# Patient Record
Sex: FEMALE | Race: WHITE | Employment: STUDENT | URBAN - METROPOLITAN AREA
[De-identification: names, ages, dates, MRNs, and addresses within clinical notes are randomized per-mention and may not be internally consistent; named-entity substitution may affect disease eponyms.]

---
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
## 2024-01-24 ENCOUNTER — TELEPHONE (OUTPATIENT)
Dept: OBGYN CLINIC | Facility: CLINIC | Age: 19
End: 2024-01-24

## 2024-01-24 ENCOUNTER — ROUTINE PRENATAL (OUTPATIENT)
Dept: OBGYN CLINIC | Facility: CLINIC | Age: 19
End: 2024-01-24
Payer: MEDICAID

## 2024-01-24 VITALS
HEART RATE: 87 BPM | SYSTOLIC BLOOD PRESSURE: 118 MMHG | DIASTOLIC BLOOD PRESSURE: 58 MMHG | BODY MASS INDEX: 24 KG/M2 | WEIGHT: 134 LBS

## 2024-01-24 DIAGNOSIS — Z34.00 ENCOUNTER FOR SUPERVISION OF NORMAL PREGNANCY IN TEEN PRIMIGRAVIDA, ANTEPARTUM: Primary | ICD-10-CM

## 2024-01-24 PROCEDURE — 99213 OFFICE O/P EST LOW 20 MIN: CPT | Performed by: NURSE PRACTITIONER

## 2024-01-24 PROCEDURE — 3078F DIAST BP <80 MM HG: CPT | Performed by: NURSE PRACTITIONER

## 2024-01-24 PROCEDURE — 3074F SYST BP LT 130 MM HG: CPT | Performed by: NURSE PRACTITIONER

## 2024-01-24 NOTE — TELEPHONE ENCOUNTER
Patient mobile number does not even ring.  No answer and no voicemail at home number.  Per verbal release- okay to share sensitive information with grandparent Alissa.  Attempted to reach Alissa at home and on cell but no answer and no voicemails.  Patient has not read [employer] medical message yet.

## 2024-01-24 NOTE — TELEPHONE ENCOUNTER
Pt was schedule for an DEION today at our [place] location with Raquel. I was unable to leave a voicemail for pt, but was able to send a MyChart message regarding missed appointment for today.

## 2024-01-24 NOTE — PROGRESS NOTES
Chief Complaint   Patient presents with    Prenatal Care     DEION       18 year old  at 28w6d who presents for routine OB visit without complaints. Doing well.   Patient denies any bleeding, leaking fluid, cramping, or contractions.  Assessment/Plan:   28w6d doing well.  Continue routine prenatal care  Kick counts reminded  Reviewed  labor signs and symptoms.  Reviewed vaccine recommendations:  Tdap recommended  Encouraged she complete her 1 hour GTT< HIV and T Pallidum added.  Diagnoses and all orders for this visit:    Encounter for supervision of normal pregnancy in teen primigravida, antepartum      Return in about 2 weeks (around 2024).     Subjective:   Review of Systems:  General: no complaints per category. See HPI for additional information.   Breast: no complaints per category. See HPI for additional information.   Respiratory: no complaints per category. See HPI for additional information.   Cardiovascular: no complaints per category. See HPI for additional information.   GI: no complaints per category. See HPI for additional information.   : no complaints per category. See HPI for additional information.   Heme: no complaints per category. See HPI for additional information.   Obstetrical History:   OB History    Para Term  AB Living   1             SAB IAB Ectopic Multiple Live Births                  # Outcome Date GA Lbr Robby/2nd Weight Sex Delivery Anes PTL Lv   1 Current              Gynecological History: na  Medications:   Prenatal Vit w/So-Hzkuutahl-EK (PNV OR) Take by mouth daily.      Pyridoxine HCl (VITAMIN B-6 OR) Take by mouth daily as needed.        Allergies:  No Known Allergies  Past Medical History:  History reviewed. No pertinent past medical history.  Past Surgical History:  History reviewed. No pertinent surgical history.  Immunizations:   Immunization History   Administered Date(s) Administered    DTAP 2005, 2005, 10/20/2005, 10/20/2005,  2006, 2006, 2008, 01/10/2008    DTAP-IPV 07/15/2010    FLU VAC QIV SPLIT 3 YRS AND OLDER (34513) 2019    FLUMIST Intranasal Influenza 10/20/2010, 2012    HEP A 2014    HEP A,Ped/Adol,(2 Dose) 2014, 08/10/2016    HEP B Vaccine 2005, 10/20/2005, 2006    HEP B/HIB 2005, 10/20/2005, 2006    Haemophilus B Polysac Conj Vac Im 2005, 10/20/2005, 2006    Hpv Virus Vaccine 9 Courtney Im 08/10/2016, 2018    IPV 2005, 2005, 10/20/2005, 10/20/2005, 2008, 01/10/2008    Influenza 10/20/2010, 2012    Influenza(Afluria)0.5ml QIV PFS 2021    MMR 2006, 07/15/2010    Meningococcal-Menactra 08/10/2016    Pneumococcal (Prevnar 13) 2005, 10/20/2005, 2006    Pneumococcal (Prevnar 7) 2005, 10/20/2005, 2006    TDAP 08/10/2016    Varicella 2006, 07/15/2010     Objective:     Vitals:    24 1445   BP: 118/58   Pulse: 87   Weight: 134 lb (60.8 kg)     Body mass index is 24.12 kg/m².  Physical Examination:  General appearance: Well dressed, well nourished in no apparent distress  Neurologic/Psychiatric: Alert and oriented to person, place and time, mood normal, affect appropriate  Abdomen: Soft,     Patient Active Problem List    Diagnosis    Marijuana use during pregnancy     Denies any alcohol or other drug use  [X] MFM  Cannabis cessation advised       Supervision of normal first teen pregnancy    Mood disorder (HCC)    Academic problem    Attention deficit hyperactivity disorder (ADHD), combined type     Total patient time was 20 minutes in reviewing paper/electronic records, reviewing test results from outside care provider, obtaining history, evaluating the patient, consultation, discussing treatment options, coordination of care and completing documentation. This included face to face and non-face to face actions. The patient's questions and concerns were addressed.

## 2024-02-02 NOTE — TELEPHONE ENCOUNTER
30w1d   Patient is flying to [place] just for the weekend to visit family.  Patient is familiar with the area and has access to emergency care if needed.  Informed patient that the airport scanners/metal detectors do not pose a risk to her pregnancy.  Discussed higher risk for blood clots in pregnancy.  Advised staying well hydrated, walking about the cabin once every hour (when safe), wearing seat belt.  Reviewed symptoms of blood clot and instructed to seek emergency care if any develop.  Patient verbalized understanding.   Next appointment scheduled 2/5/24.

## 2024-02-05 NOTE — PROGRESS NOTES
Chief Complaint   Patient presents with    Prenatal Care     [place]     Routine prenatal visit complaining of rash under right amrpit  Patient denies any bleeding, leaking fluid, cramping, or regular uterine contractions. Good fetal movement.  R axilla- rash consistent with candidiasis  Assessment/Plan:  30w4d doing well  Recommend OTC antifungal  OVERDUE FOR GLUCOLA.  Kick counts reminded  Reviewed  labor signs and symptoms.  Reviewed vaccine recommendations:  Tdap today.  Diagnoses and all orders for this visit:    Prenatal care, antepartum    Need for vaccination  -     TdaP (Boostrix/Adacel) Vaccine (> 7 Y)       Return in about 2 weeks (around 2024) for Routine Prenatal Visit, Labs Next Available.

## 2024-02-05 NOTE — PATIENT INSTRUCTIONS
LABOR & DELIVERY PRE-REGISTRATION    Thank you for choosing [facility] for you labor and delivery needs.  We look forward to caring for you and your family in this exciting time.  In order to better care for all of our patients, [facility] recommends that you complete your delivery registration as early in your pregnancy as possible.  Registering for your delivery in advance saves you the time of doing so on your day of delivery and allows your care team to be better prepared for your delivery date.  For more information about Labor and Delivery at [facility] and to pre-register, visit [email or web address]--> Search Our Services-->Pregnancy & Baby.    TWO WAYS TO REGISTER ONLINE    Epic MyChart allows access to multiple medical organizations, including [facility] and other medical organizations that use the Epic electronic health record system.  To add [facility] or any other medical organization, just tap My Organizations at the top left corner of the login page in the [email or web address] jefry, and then click Add Organization.  Cook in [email or web address]- Pre-register for your hospital stay through your [email or web address] account.  After logging into [email or web address], click on Visits.  Under Visits, click on Cook for Delivery and follow the directions to register.  You may print the confirmation page.  If you do not already have a [employer] account, ask our office for an Access Code.  Go to [email or web address] and follow the prompts to set up your account.  Cook on [email or web address]- Pre-register for your hospital stay through the convenient online form on our website.  Go to [email or web address].  Scroll down the page and click on [facility].  Fill out all of the required information and click submit.  You will receive a confirmation of your submission.  Questions about registering for your hospital stay?  Contact the [facility]-Labor & Delivery at [phone].      PRENATAL CLASSES    Both in-person and  virtual classes are available.  Weeknight and weekend classes are available.  Go to [email or web address]   or   [email or web address]  Click on drop down menu on the right side  Scroll down to \"Find a Class or Support Group\"  Scroll down and click on \"[facility]\"  Type any of the following in the Search Bar  - Babycare  - Virtual Tour- for the [facility]  - Natural Childbirth (even if you are planning on having an epidural).  Topics include labor and birth, breathing techniques, epidurals, postpartum issues.  - Virtual Childbirth Express  - Breastfeeding       FETAL MOVEMENT CHART    Although not all women need to perform daily fetal movement counts, if you notice a decrease in fetal activity, you should contact our office immediately.      Begin counting fetal movements at 32 weeks of pregnancy.  You may find that your baby is more active after eating or drinking.       We want you to time how long it takes to feel 10 movements (kicks, flutters, swishes or rolls).  You should feel at least 10 movements in 2 hours.  You will likely feel 10 movements in less than 2 hours.    If you have concerns, you can use the attached table to record movements.  Record the time you feel the first movement and the tenth movement.  This will help you observe patterns and discover how long it normally takes your baby to move 10 times.       Please call us if any of the following occurs:  You have not felt the baby move for a couple of hours  It is taking longer each day to get the tenth movement    The main point is we want you to know the characteristics of your baby, so you can tell the doctor or nurse if something different is happening.    Again, if you notice a decrease in fetal movement, please give the office a call.    If our office is closed, the answering service will page the doctor on-call.    ------------------------------      Time M T W Th F S S                                                                                                                  Time M T W Th F S S                                                                                                           Time M T W Th F S S                                                                                                           Time M T W Th F S S                                                                                                         VACCINE RECOMMENDATIONS     Pertussis- [place] has significant outbreaks of pertussis (whooping cough) every year.  Therefore, the CDC recommends that all pregnant women receive a Tdap vaccine during pregnancy, regardless of whether you have received one previously.  The vaccine reduces your chances of mohan the illness, and also provides some natural immunity to your baby for possible exposures after birth.  The best time to get the vaccine is between 27 and 36 weeks.  Baby caregivers (grandparents, spouse) should also make sure they are up to date on the vaccine (within the last 10 years).     Influenza- Pregnant women who develop the flu are more prone to serious complications that can affect both mother and baby.  The CDC recommends that all women who will be pregnant during flu season (October to May) receive the flu vaccine (injection only, not nasal spray).  The vaccine can be given during any stage of pregnancy.     Both vaccines are offered in our office, as well as through many pharmacies and primary care offices.    Covid-19 Vaccine   If you are pregnant or were recently pregnant, you are more likely to get very sick from COVID-19 than people who are not pregnant. Additionally, if you have COVID-19 during pregnancy, you are more likely to have complications that can affect your pregnancy and developing baby.  Please check the CDC website for the most up-to-date recommendations on Covid vaccination at www.cdc.gov/coronavirus/vaccine    COVID-19  vaccination is recommended for everyone ages 6 months and older, including people who are pregnant, breastfeeding, trying to get pregnant now, or who might become pregnant in the future. Evidence shows that COVID-19 vaccination before and during pregnancy is safe and effective and suggests that the benefits of vaccination outweigh any known or potential risks. New data show that vaccination during pregnancy can help protect babies younger than 6 months old, when they are too young to be vaccinated themselves, from hospitalization due to COVID-19.    RSV Vaccine  RSV (Respiratory Syncytial Virus) is a common respiratory virus that causes cold like symptoms in adults and babies.  Infants and adults over 60 years old are more likely to develop severe RSV infection requiring hospitalization.  A new RSV vaccine was released in October 2023 that if given during the third trimester of pregnancy, reduces your baby's risk of being hospitalized with RSV after birth by up to 80%.  Therefore the CDC recommends that pregnant moms receive one dose of the RSV vaccine Pfizer Abrysvo sometime between 32 and 36 weeks of pregnancy, before or during RSV season (September through January).  If you decide not to get the vaccine, you can talk to your pediatrician about your baby receiving the RSV antibody injection Beyfortus after birth.

## 2024-03-06 NOTE — PROGRESS NOTES
DEION  Doing well, no concerns or questions  GOOD FM  Denies VB/LOF/uctx  Discussed 1 hour GTT, upcoming GBS  RTC in 1-2 wks  Fetal movement discussed/ emphasized

## 2024-03-20 NOTE — PROGRESS NOTES
[facility]  Obstetrics and Gynecology  Routine OB visit Progress Note    Subjective:     Shelby Montgomery is a 18 year old  at 36w6d who presents for routine OB visit.  Patient reports doing well.  Denies contractions, vaginal bleeding or leakage of fluid.  Good fetal movement.      Assessment:     Shelby Montgomery is a 18 year old  at 36w6d who presents for routine OB visit. Overall doing well.     Problem List Items Addressed This Visit          Mental Health    Marijuana use during pregnancy (Formerly [facility])    Relevant Orders    Drug Abuse Panel 10 w/confirm       Gravid and     Supervision of normal first teen pregnancy (Formerly [facility]) - Primary    Relevant Orders    Hemoglobin A1C     Other Visit Diagnoses       Encounter for supervision of normal pregnancy, antepartum, unspecified  (Formerly [facility] [place])        Relevant Orders    Group B Strep PCR                Plan:     Patient Active Problem List    Diagnosis    Marijuana use during pregnancy (Formerly [facility])     Denies any alcohol or other drug use. Reports stopped 4 days ago on .   [X] MFM  [ ] urine tox  Cannabis cessation advised       Supervision of normal first teen pregnancy (Former[facility])     [  ] Overdue for glucola - reports difficulty with getting ride to lab   [ ] HgbA1C ordered       Mood disorder (Formerly [facility])    Academic problem    Attention deficit hyperactivity disorder (ADHD), combined type       - continue routine prenatal care   - labor and rupture of membrane precautions provided  -Fetal movement precautions given  GBS No results found for: \"GBS\" - collected   -Encouraged patient to complete 1 hour glucose tolerance test, discussed with patient importance of screening for diabetes in pregnancy      Return to clinic in 1 week for DEION visit           History/Other:     Review of Systems:  General: no complaints per category. See HPI for additional information.   Breast: no complaints per category. See HPI for additional information.    Respiratory: no complaints per category. See HPI for additional information.   Cardiovascular: no complaints per category. See HPI for additional information.   GI: no complaints per category. See HPI for additional information.   : no complaints per category. See HPI for additional information.   Heme: no complaints per category. See HPI for additional information.     Obstetrical History:   OB History    Para Term  AB Living   1             SAB IAB Ectopic Multiple Live Births                  # Outcome Date GA Lbr Robby/2nd Weight Sex Delivery Anes PTL Lv   1 Current                  Gynecological History:     Patient's last menstrual period was 07/15/2023 (approximate).      Medications:   cephalexin 500 MG Oral Cap       promethazine 25 MG Oral Tab       Prenatal Vit w/Gf-Planxfkbk-QJ (PNV OR) Take by mouth daily.          Allergies:  No Known Allergies    Past Medical History:  History reviewed. No pertinent past medical history.    Past Surgical History:  History reviewed. No pertinent surgical history.    Immunizations:   Immunization History   Administered Date(s) Administered    DTAP 2005, 2005, 10/20/2005, 10/20/2005, 2006, 2006, 2008, 01/10/2008    DTAP-IPV 07/15/2010    FLU VAC QIV SPLIT 3 YRS AND OLDER (06768) 2019    FLUMIST Intranasal Influenza 10/20/2010, 2012    HEP A 2014    HEP A,Ped/Adol,(2 Dose) 2014, 08/10/2016    HEP B Vaccine 2005, 10/20/2005, 2006    HEP B/HIB 2005, 10/20/2005, 2006    Haemophilus B Polysac Conj Vac Im 2005, 10/20/2005, 2006    Hpv Virus Vaccine 9 Courtney Im 08/10/2016, 2018    IPV 2005, 2005, 10/20/2005, 10/20/2005, 2008, 01/10/2008    Influenza 10/20/2010, 2012    Influenza(Afluria)0.5ml QIV PFS 2021    MMR 2006, 07/15/2010    Meningococcal-Menactra 08/10/2016    Pneumococcal (Prevnar 13) 2005, 10/20/2005, 2006     Pneumococcal (Prevnar 7) 04/09/2005, 10/20/2005, 05/26/2006    TDAP 08/10/2016, 02/05/2024    Varicella 07/31/2006, 07/15/2010         Objective:     Objective:       Vitals:    03/20/24 1032   BP: 112/72   Pulse: 64   Weight: 147 lb 8 oz (66.9 kg)   Height: 62.5\"         Body mass index is 26.55 kg/m².    General: AAO.NAD.   CVS exam: normal peripheral perfusion  Chest: non-labored breathing, no tachypnea   Abdominal exam: gravid   Pelvic exam:    VULVA: normal appearing vulva with no masses, tenderness or lesions  PERINEUM: normal appearing, no lesions   URETHRAL MEATUS:  normal appearing, no lesions   VAGINA: normal appearing vagina with normal color and discharge, no lesions  CERVIX: normal appearing cervix without discharge or lesions. SVE 0/50/-3.   UTERUS: uterus is normal size, shape, consistency and nontender  ADNEXA: deferred  PERIRECTAL: normal appearing, no lesions     Labs:  Prenatal Results       Initial Prenatal Labs EDWARD (GA 0-24w)       Test Value Reference Range Date Time    ABO Group  O   12/08/23 1115    RH Type  Positive   12/08/23 1115    Antibody Screen OB  Negative   12/08/23 1115    Rubella Titer OB  Positive  Positive 12/08/23 1115    Hep B Surf Ag OB  Nonreactive  Nonreactive  12/08/23 1115    RPR Serology        TREP  Nonreactive  Nonreactive  12/08/23 1115    HIV Combo Result OB  Non-Reactive  Non-Reactive 12/08/23 1115    HGB  11.9 g/dL 12.0 - 16.0 12/08/23 1115    HCT  35.0 % 35.0 - 48.0 12/08/23 1115    MCV  91.1 fL 80.0 - 100.0 12/08/23 1115    Platelets  200.0 10(3)uL 150.0 - 450.0 12/08/23 1115    Urine Culture  No Growth at 18-24 hrs.   10/17/23 1625    HCV  Nonreactive  Nonreactive  12/08/23 1115              Additional Prenatal Labs (GA 0-24w)       Test Value Reference Range Date Time    Chlamydia with Pap  Negative  Negative 10/17/23 1625    GC with Pap  Negative  Negative 10/17/23 1625    Chlamydia        GC         Pap Smear        HPV        Sickel Cell Solubility HGB                   2nd Trimester Labs (GA 24-41w)       Test Value Reference Range Date Time    Antibody Screen OB        Serology RPR        HGB  11.3 g/dL 12.0 - 16.0 12/27/23 1506    HCT  33.9 % 35.0 - 48.0 12/27/23 1506    Glucose 1 hour        Glucose Bel 3 hr Gestational Fasting        1 Hour glucose        2 Hour glucose        3 hour glucose        Ferritin                  3rd Trimester (28-41w)       Test Value Reference Range Date Time    Blood Type        Antibody screen        Group B Strep OB        HGB        HCT        HIV Combo Result OB  Non-Reactive  Non-Reactive 02/03/24 1109    Rapid HIV        Ferritin                  First Trimester & Genetic Testing (GA 0-40w)       Test Value Reference Range Date Time    MaternaT-21 (T13)        MaternaT-21 (T18)        MaternaT-21 T(T21)        VISIBILI T (T21)        VISIBILI T (T18)        QNatal T13        QNatal T18        QNatal T21        Cystic Fibrosis Screen [32]        Cystic Fibrosis Screen [165]        Cystic Fibrosis Screen [165]        Cystic Fibrosis Screen [165]        Cystic Fibrosis Screen [165]        RH d ([employer])        CVS        Nuchal Screening        NIPT [T13]        NIPT [T18]        NIPT [T21]        Carrier Screen        First Trimester Screen                  Genetic Screening (GA 0-45w)       Test Value Reference Range Date Time    AFP Tetra-Patient's HCG        AFP Tetra-Mom for HCG        AFP Tetra-Patient's UE3        AFP Tetra-Mom for UE3        AFP Tetra-Patient's DAVID        AFP Tetra-Mom for DAVID        AFP Tetra-Patient's AFP        AFP Tetra-Mom for AFP        AFP. Spina Bifida        Quad Screen ([facility])        AFP        SMA                  Legend    ^: Historical                                  All of the findings and plan were discussed with the patient.  She notes understanding and agrees with the plan of care.  All questions were answered to the best of my ability at this time.      Total patient time was 24 minutes  in evaluation, consultation, and coordination of care. This included face to face and non-face to face actions. The patient's questions and concerns were addressed.       Apryl Potter MD   [facility] - OBGYN        Note to patient and family   The 21st Century Cures Act makes medical notes available to patients in the interest of transparency.  However, please be advised that this is a medical document.  It is intended as fids-we-vabl communication.  It is written and medical language may contain abbreviations or verbiage that are technical and unfamiliar.  It may appear blunt or direct.  Medical documents are intended to carry relevant information, facts as evident, and the clinical opinion of the practitioner.      This note could include assistance by Dragon voice recognition. Errors in content may be related to improper recognition by the system; efforts to review and correct have been done but errors may still exist.

## 2024-03-20 NOTE — PATIENT INSTRUCTIONS
Gestational Diabetes Screening Instructions     This is a routine test done during pregnancy during the 24-28th week of gestation. The purpose of this test is to determine if your body is able to process sugar and to see if you have developed gestational diabetes.   Please follow the instructions below very carefully so you will not have to repeat the test.     1.  ___x___ One Hour Glucose Test--Do not eat anything two hours before   your scheduled test. Plan to arrive at the office at least 15 minutes before   your scheduled appointment. Please notify the  that you are   having the sugar test done. This test may not be scheduled in the last   hour of our business.       ______ Three Hour Glucose Test--You may not have food 8 hours   before  your scheduled test. Drinking water is allowed during these 8   hours. Plan to  arrive at the office at least 15 minutes before your    scheduled appointment. Please notify the  that you are   having the sugar test done. To make this easiest on yourself, this    test should be scheduled during the morning hours of our business.     2. You will be given a chilled glucola drink. You must drink the entire    contents of the bottle within 5 minutes. The time you finish the drink must   be marked down.     3. After starting the test, you will not be allowed to leave the office or have   anything to eat or drink. This includes all foods, beverages, candy, mints,   gum, etc.     4.  Your blood will be drawn in the office lab, exactly sixty minutes after you   have finished the glucola drink.     5. Your provider/staff nurse will notify you of your results when they come   back.    Labor Instructions    How do I know if it’s true labor?  One of the most important aspects of any pregnancy is being able to recognize the onset of labor.  Unfortunately, on occasion it can be difficult or confusing, especially if you have had one or more children.  Each labor can  begin in a different way even if you have had four or five children.    If this is your first child, it is very common to have labor for an average greater than 10 hours; however, there have been rare instances for labor to be two hours or less for a first time mother. It is more important for you to know if this is your second or third baby to realize that any labor after the first is usually shorter.  There is no way to tell how long or short the labor will be. Therefore, please call us if you are unsure labor has started.       Usually, during the last six weeks of pregnancy, [place]-Casas contractions or “false labor pains occur”.  False labor is generally not very painful though it is not always easy to tell.  You may feel contractions, cramps or uterine tightening somewhere between every 3-30 minutes but they will not continue to get stronger over time.  If you lie down, drink plenty of fluid or walk around, the contractions may go away.   False contractions are very common if you have been active on your feet for several hours.   Women frequently worry about being sent home from the hospital without having their baby (i.e. the labor stopped).  Actually, this is an unnecessary worry, for this is an infrequent occurrence.     True labor usually begins in one of two ways.  In most patients it begins with contractions of the uterus, which are irregular (but not always) in the beginning.  They are cramp-like in character and feel similar to menstrual cramps.  After a while, they become more regular, and they seem to last a little longer, and feel a little sharper.  These symptoms are very important-more important- than the timing of the contractions.  Having regular (usually closer), longer lasting (35-70 seconds), and sharper (more painful) contractions are the common symptoms of actual labor.  The second way in which labor can begin which occurs in approximately 30% of all patients is the rupture of the bag of  water.  This is a sudden gush of watery fluid, usually sufficient to run down your leg and onto the floor, or you may wet a large area of the bed if it happens at night.  There may also be tricking that is uncontrolled.  If you are unsure, please call the office.      When should I call?  When contractions are strong and every 3-5 minutes.  If you have a gush of water or you think you might be leaking fluid.  If you are bleeding heavily.  If your baby is not moving around at least every 1-2 hours.  If you are worried about something.  When you think you are ready to go to the hospital.    Who do I call?  Call the office at [phone].  If the office is closed, the answering service will send a message to the physician on call.  The on call physician will be available for emergency phone calls only.          Can I eat in labor?  It is good to eat a light meal at home before going to the hospital.  Eat foods like crackers, popsicles, soup and fruit.  Avoid foods that are difficult to digest like meat, a lot of dairy products and high fat foods.  DO NOT EAT if you know you are scheduled for a  ().      What will happen when I get to the hospital?  When you arrive at the hospital, you will be admitted and examined.   There are a few factors that will determine if you will be allowed to be up out of bed or if you would need to stay in bed.  The main factors are how well the baby is entering into the pelvis and if the bag of acosta is in intact or ruptured.  An intravenous (IV) solution will, with exceptions, be started.  This is extremely important especially for the baby.   Your  will be allowed in the room during your labor. During the delivery, the nurses will inform you of the hospital policy and how many coaches are allowed.  You may desire pain medication or anesthesia for pain.  You probably discussed some aspects of pain medication with us during your prenatal care.  The various options  may also have been discussed in Prenatal Classes.  We utilize IV narcotics and epidural anesthesia when our patients request to have them.  If you chose to have no anesthesia, none will be administered.  A local anesthetic may be used at the time of delivery      What should I to bring to the hospital?  Maternity clothes to go home in  You can bring your own night gown to wear after giving birth, but most women prefer to wear the hospital gown because it may get soiled  Nursing Bra if you are planning to breastfeed  Clothes for your baby to go home in  Baby Car Seat.  Be sure you know how to install it correctly. Please install it before going to the hospital  Routine toiletries like toothbrush, shampoo, hairbrush and etc.   You can bring your favorite pillow, but please put a colored pillow case on it so it doesn’t get mixed up with hospital pillows    How long will I stay in the hospital?  The date you leave the hospital may vary depending on the speed of your recovery.  If you have a vaginal delivery, you will stay in the hospital 24-48 hours after your delivery as long as you aren’t having any complications.    If you have had a , you will stay in the hospital 48-72 hours as long as you aren’t having any complications.       Going Home Instructions  There are no set rules as to what you may do each day or week after you are home.  You will receive discharge instructions to help you each day.  Remember, early ambulation in the hospital is to prevent complications.  Do not let this lull you into a false sense of strength or ability to do certain physical acts which may tire you excessively.  Please call the office within a few days after you are discharged from the hospital to schedule your post-partum visit, which is usually 4-6 weeks after delivery.    Any medications necessary will be discussed on an individual basis.  If you decide to breastfeed your baby, you should continue your prenatal vitamins.   If you do not breastfeed, simply finish the prenatal vitamins you have.      The staff at [facility] wish you a joyous and exciting birth.  If there is anything we can do to make this a better experience for you please do not hesitate to ask.

## 2024-04-04 NOTE — PROGRESS NOTES
Pt is a  at 39 0/7 wk iup who is brought to room triage-2 for NST. Pt was seen in the office early this afternoon and variable decelerations were noted on her NST. Pt was instructed to come to L&D for further eval at that time. Pt reports + fm and  denies LOF, VB or UC's. General hx taken and general assessment done. EFM tested and applied. POC discussed and questions answered.

## 2024-04-04 NOTE — PLAN OF CARE
Problem: BIRTH - VAGINAL/ SECTION  Goal: Fetal and maternal status remain reassuring during the birth process  Description: INTERVENTIONS:  - Monitor vital signs  - Monitor fetal heart rate  - Monitor uterine activity  - Monitor labor progression (vaginal delivery)  - DVT prophylaxis (C/S delivery)  - Surgical antibiotic prophylaxis (C/S delivery)  Outcome: Progressing

## 2024-04-04 NOTE — PATIENT INSTRUCTIONS
Labor Instructions    How do I know if it’s true labor?  One of the most important aspects of any pregnancy is being able to recognize the onset of labor.  Unfortunately, on occasion it can be difficult or confusing, especially if you have had one or more children.  Each labor can begin in a different way even if you have had four or five children.    If this is your first child, it is very common to have labor for an average greater than 10 hours; however, there have been rare instances for labor to be two hours or less for a first time mother. It is more important for you to know if this is your second or third baby to realize that any labor after the first is usually shorter.  There is no way to tell how long or short the labor will be. Therefore, please call us if you are unsure labor has started.       Usually, during the last six weeks of pregnancy, Nikolas-Casas contractions or “false labor pains occur”.  False labor is generally not very painful though it is not always easy to tell.  You may feel contractions, cramps or uterine tightening somewhere between every 3-30 minutes but they will not continue to get stronger over time.  If you lie down, drink plenty of fluid or walk around, the contractions may go away.   False contractions are very common if you have been active on your feet for several hours.   Women frequently worry about being sent home from the hospital without having their baby (i.e. the labor stopped).  Actually, this is an unnecessary worry, for this is an infrequent occurrence.     True labor usually begins in one of two ways.  In most patients it begins with contractions of the uterus, which are irregular (but not always) in the beginning.  They are cramp-like in character and feel similar to menstrual cramps.  After a while, they become more regular, and they seem to last a little longer, and feel a little sharper.  These symptoms are very important-more important- than the timing of the  contractions.  Having regular (usually closer), longer lasting (35-70 seconds), and sharper (more painful) contractions are the common symptoms of actual labor.  The second way in which labor can begin which occurs in approximately 30% of all patients is the rupture of the bag of water.  This is a sudden gush of watery fluid, usually sufficient to run down your leg and onto the floor, or you may wet a large area of the bed if it happens at night.  There may also be tricking that is uncontrolled.  If you are unsure, please call the office.      When should I head to the hospital?  When contractions are strong and every 5 minutes for one hour.  If you have a gush of water or you think you might be leaking fluid.  If you are bleeding heavily.  If your baby is not moving around at least every 1-2 hours.  If you are worried about something.  When you think you are ready to go to the hospital.    Who do I call with concerns?  Call the office at [phone].  If the office is closed, the answering service will send a message to the physician on call.  The on call physician will be available for emergency phone calls only.          Can I eat in labor?  It is good to eat a light meal at home before going to the hospital.  Eat foods like crackers, popsicles, soup and fruit.  Avoid foods that are difficult to digest like meat, a lot of dairy products and high fat foods.  DO NOT EAT if you know you are scheduled for a  ().      What will happen when I get to the hospital?  You are going to [facility] in [facility].  After 8 pm, you will need to go through the Emergency Department.  When you arrive at the hospital, you will be admitted and examined.   There are a few factors that will determine if you will be allowed to be up out of bed or if you would need to stay in bed.  The main factors are how well the baby is entering into the pelvis and if the bag of acosta is in intact or ruptured.   An intravenous (IV) solution will, with exceptions, be started.  This is extremely important especially for the baby.   Your  will be allowed in the room during your labor. During the delivery, the nurses will inform you of the hospital policy and how many coaches are allowed.  You may desire pain medication or anesthesia for pain.  You probably discussed some aspects of pain medication with us during your prenatal care.  The various options may also have been discussed in Prenatal Classes.  We utilize IV narcotics and epidural anesthesia when our patients request to have them.  If you chose to have no anesthesia, none will be administered.  A local anesthetic may be used at the time of delivery      What should I to bring to the hospital?  Maternity clothes to go home in  You can bring your own night gown to wear after giving birth, but most women prefer to wear the hospital gown because it may get soiled  Nursing Bra if you are planning to breastfeed  Clothes for your baby to go home in  Baby Car Seat.  Be sure you know how to install it correctly. Please install it before going to the hospital  Routine toiletries like toothbrush, shampoo, hairbrush and etc.   You can bring your favorite pillow, but please put a colored pillow case on it so it doesn’t get mixed up with hospital pillows    How long will I stay in the hospital?  The date you leave the hospital may vary depending on the speed of your recovery.  If you have a vaginal delivery, you will stay in the hospital 24-48 hours after your delivery as long as you aren’t having any complications.    If you have had a , you will stay in the hospital 48-72 hours as long as you aren’t having any complications.       Going Home Instructions  There are no set rules as to what you may do each day or week after you are home.  You will receive discharge instructions to help you each day.  Remember, early ambulation in the hospital is to prevent  complications.  Do not let this lull you into a false sense of strength or ability to do certain physical acts which may tire you excessively.  Please call the office within a few days after you are discharged from the hospital to schedule your post-partum visit, which is usually 4-6 weeks after delivery.    Any medications necessary will be discussed on an individual basis.  If you decide to breastfeed your baby, you should continue your prenatal vitamins.  If you do not breastfeed, simply finish the prenatal vitamins you have.      The staff at [facility] wish you a joyous and exciting birth.  If there is anything we can do to make this a better experience for you please do not hesitate to ask.        KICK COUNT INSTRUCTIONS    After 28 weeks of pregnancy, we would like for you to monitor your baby’s movement daily by doing kick counts, an easy way for you to assess your baby’s well-being.    How to count kicks:  Choose a time when the baby is active, such as after a meal.  Sit comfortably or lie on your side, and count the number of movements in an hour… you should feel 10 movements in 2 hours    The evening hours seem to be the most convenient time to do this test, although you can also do this test anytime you are concerned about the baby’s movement.    Call the office right away at [phone] if you notice any of the following:  Your baby moves less than 10 times in 2 hours while you are doing kick counts.  Your baby moves much less often than on the days before.  You have not felt your baby move all day.

## 2024-04-04 NOTE — PROGRESS NOTES
[facility]  Obstetrics and Gynecology  Routine OB visit Progress Note    Subjective:     Shelby Montgomery is a 18 year old  at 39w0d who presents for routine OB visit.  Patient reports doing well.   Denies contractions, vaginal bleeding or leakage of fluid.  Good fetal movement.    Review of Systems:  General: no complaints per category. See HPI for additional information.   Breast: no complaints per category. See HPI for additional information.   Respiratory: no complaints per category. See HPI for additional information.   Cardiovascular: no complaints per category. See HPI for additional information.   GI: no complaints per category. See HPI for additional information.   : no complaints per category. See HPI for additional information.   Heme: no complaints per category. See HPI for additional information.     History/Other:     Obstetrical History:   OB History    Para Term  AB Living   1             SAB IAB Ectopic Multiple Live Births                  # Outcome Date GA Lbr Robby/2nd Weight Sex Delivery Anes PTL Lv   1 Current                  Gynecological History:     Patient's last menstrual period was 07/15/2023 (approximate).      Medications:  No outpatient medications have been marked as taking for the 24 encounter (Routine Prenatal) with Walker Torres MD.        Allergies:  No Known Allergies    Past Medical History:  No past medical history on file.    Past Surgical History:  No past surgical history on file.    Immunizations:   Immunization History   Administered Date(s) Administered    DTAP 2005, 2005, 10/20/2005, 10/20/2005, 2006, 2006, 2008, 01/10/2008    DTAP-IPV 07/15/2010    FLU VAC QIV SPLIT 3 YRS AND OLDER (67654) 2019    FLUMIST Intranasal Influenza 10/20/2010, 2012    HEP A 2014    HEP A,Ped/Adol,(2 Dose) 2014, 08/10/2016    HEP B Vaccine 2005, 10/20/2005, 2006    HEP B/HIB  04/09/2005, 10/20/2005, 06/06/2006    Haemophilus B Polysac Conj Vac Im 04/09/2005, 10/20/2005, 06/06/2006    Hpv Virus Vaccine 9 Courtney Im 08/10/2016, 05/07/2018    IPV 04/09/2005, 04/09/2005, 10/20/2005, 10/20/2005, 01/08/2008, 01/10/2008    Influenza 10/20/2010, 09/12/2012    Influenza(Afluria)0.5ml QIV PFS 01/11/2021    MMR 07/31/2006, 07/15/2010    Meningococcal-Menactra 08/10/2016    Pneumococcal (Prevnar 13) 04/09/2005, 10/20/2005, 05/26/2006    Pneumococcal (Prevnar 7) 04/09/2005, 10/20/2005, 05/26/2006    TDAP 08/10/2016, 02/05/2024    Varicella 07/31/2006, 07/15/2010           Objective:     Objective:       Vitals:    04/04/24 1109   BP: 102/60   Weight: 149 lb 6.4 oz (67.8 kg)   Height: 62.5\"         Body mass index is 26.89 kg/m².    General: AAO.NAD.   CVS exam: normal peripheral perfusion  Chest: non-labored breathing, no tachypnea   Abdominal exam: gravid   Pelvic exam: 2/80/-2      Labs:  Prenatal Results       Initial Prenatal Labs EDWARD (GA 0-24w)       Test Value Reference Range Date Time    ABO Group  O   12/08/23 1115    RH Type  Positive   12/08/23 1115    Antibody Screen OB  Negative   12/08/23 1115    Rubella Titer OB  Positive  Positive 12/08/23 1115    Hep B Surf Ag OB  Nonreactive  Nonreactive  12/08/23 1115    RPR Serology        TREP  Nonreactive  Nonreactive  12/08/23 1115    HIV Combo Result OB  Non-Reactive  Non-Reactive 12/08/23 1115    HGB  11.9 g/dL 12.0 - 16.0 12/08/23 1115    HCT  35.0 % 35.0 - 48.0 12/08/23 1115    MCV  91.1 fL 80.0 - 100.0 12/08/23 1115    Platelets  200.0 10(3)uL 150.0 - 450.0 12/08/23 1115    Urine Culture  No Growth at 18-24 hrs.   10/17/23 1625    HCV  Nonreactive  Nonreactive  12/08/23 1115              Additional Prenatal Labs (GA 0-24w)       Test Value Reference Range Date Time    Chlamydia with Pap  Negative  Negative 10/17/23 1625    GC with Pap  Negative  Negative 10/17/23 1625    Chlamydia        GC         Pap Smear        HPV        Sickel Cell  Solubility HGB                  2nd Trimester Labs (GA 24-41w)       Test Value Reference Range Date Time    Antibody Screen OB        Serology RPR        HGB  11.3 g/dL 12.0 - 16.0 23 1506    HCT  33.9 % 35.0 - 48.0 23 1506    Glucose 1 hour        Glucose Bel 3 hr Gestational Fasting        1 Hour glucose        2 Hour glucose        3 hour glucose        Ferritin                  3rd Trimester (28-41w)       Test Value Reference Range Date Time    Blood Type        Antibody screen        Group B Strep OB  Positive  Negative 24 1107    HGB        HCT        HIV Combo Result OB  Non-Reactive  Non-Reactive 24 1109    Rapid HIV        Ferritin                  First Trimester & Genetic Testing (GA 0-40w)       Test Value Reference Range Date Time    MaternaT-21 (T13)        MaternaT-21 (T18)        MaternaT-21 T(T21)        VISIBILI T (T21)        VISIBILI T (T18)        QNatal T13        QNatal T18        QNatal T21        Cystic Fibrosis Screen [32]        Cystic Fibrosis Screen [165]        Cystic Fibrosis Screen [165]        Cystic Fibrosis Screen [165]        Cystic Fibrosis Screen [165]        RH d ([employer])        CVS        Nuchal Screening        NIPT [T13]        NIPT [T18]        NIPT [T21]        Carrier Screen        First Trimester Screen                  Genetic Screening (GA 0-45w)       Test Value Reference Range Date Time    AFP Tetra-Patient's HCG        AFP Tetra-Mom for HCG        AFP Tetra-Patient's UE3        AFP Tetra-Mom for UE3        AFP Tetra-Patient's DAVID        AFP Tetra-Mom for DAVID        AFP Tetra-Patient's AFP        AFP Tetra-Mom for AFP        AFP. Spina Bifida        Quad Screen ([facility])        AFP        SMA                  Legend    ^: Historical                                Assessment:     Shelby Montgomery is a 18 year old  at 39w0d who presents for routine OB visit. Overall doing well.     Problem List Items Addressed This Visit     None          Plan:     Patient Active Problem List    Diagnosis    GBS (group B Streptococcus carrier), +RV culture, currently pregnant ([facility])    Marijuana use during pregnancy ([facility])     Denies any alcohol or other drug use. Reports stopped 4 days ago on .   [X] MFM  [x ] urine tox:+cannabis   Cannabis cessation advised       Supervision of normal first teen pregnancy ([facility])     [  ] Overdue for glucola - reports difficulty with getting ride to lab   [ x] HgbA1C ordered 5.7  - recommend to consider delivery b/w 39-40wks as GDM unknown       Mood disorder ([facility])    Academic problem    Attention deficit hyperactivity disorder (ADHD), combined type       Doing well, +FM  Denies LOF/VB/uctx  Mode of delivery:   anticipated  SVE 2/80/-2   GBS positive  Fetal movement count given  Labor precautions provided   Cephalic on exam    Audible deceleration to the 90s heard on doppler, so patient was placed on NST. Multiple variables were seen. She was sent to L&D for monitoring vs IOL.         All of the findings and plan were discussed with the patient.  She notes understanding and agrees with the plan of care.  All questions were answered to the best of my ability at this time.      Total patient time was 15 minutes in evaluation, consultation, and coordination of care. This included face to face and non-face to face actions. The patient's questions and concerns were addressed.       Walker Torres MD   [facility] - OBGYN        Note to patient and family   The 21st Century Cures Act makes medical notes available to patients in the interest of transparency.  However, please be advised that this is a medical document.  It is intended as rvuh-wy-eexy communication.  It is written and medical language may contain abbreviations or verbiage that are technical and unfamiliar.  It may appear blunt or direct.  Medical documents are intended to carry relevant information, facts as evident, and the clinical opinion of the  practitioner.      This note could include assistance by Dragon voice recognition. Errors in content may be related to improper recognition by the system; efforts to review and correct have been done but errors may still exist.

## 2024-04-05 NOTE — CM/SW NOTE
IVETT order acknowledged.   IVETT met with patient; baby, partner and Mother at bedside. Patient presented with appropriate affect.   Patient reports this is her first pregnancy. Patient reports she lives at home with her parents and Father of the baby is involved.   OUD screen, see results of questions. Patient reports marijuana usage during pregnancy. Per Hospital policy, online DCFS report submitted.   ID#[id number].  RN updated.     Joan Pelaez LCSW  /Discharge Planner

## 2024-04-05 NOTE — PLAN OF CARE
Problem: BIRTH - VAGINAL/ SECTION  Goal: Fetal and maternal status remain reassuring during the birth process  Description: INTERVENTIONS:  - Monitor vital signs  - Monitor fetal heart rate  - Monitor uterine activity  - Monitor labor progression (vaginal delivery)  - DVT prophylaxis (C/S delivery)  - Surgical antibiotic prophylaxis (C/S delivery)  Outcome: Progressing     Problem: PAIN - ADULT  Goal: Verbalizes/displays adequate comfort level or patient's stated pain goal  Description: INTERVENTIONS:  - Encourage pt to monitor pain and request assistance  - Assess pain using appropriate pain scale  - Administer analgesics based on type and severity of pain and evaluate response  - Implement non-pharmacological measures as appropriate and evaluate response  - Consider cultural and social influences on pain and pain management  - Manage/alleviate anxiety  - Utilize distraction and/or relaxation techniques  - Monitor for opioid side effects  - Notify MD/LIP if interventions unsuccessful or patient reports new pain  - Anticipate increased pain with activity and pre-medicate as appropriate  Outcome: Progressing     Problem: ANXIETY  Goal: Will report anxiety at manageable levels  Description: INTERVENTIONS:  - Administer medication as ordered  - Teach and rehearse alternative coping skills  - Provide emotional support with 1:1 interaction with staff  Outcome: Progressing     Problem: Patient/Family Goals  Goal: Patient/Family Long Term Goal  Description: Patient's Long Term Goal: A healthy and safe delivery    Interventions:  -   - See additional Care Plan goals for specific interventions  Outcome: Progressing  Goal: Patient/Family Short Term Goal  Description: Patient's Short Term Goal: Adequate pain relief during labor    Interventions:   -   - See additional Care Plan goals for specific interventions  Outcome: Progressing

## 2024-04-05 NOTE — ANESTHESIA PREPROCEDURE EVALUATION
PRE-OP EVALUATION    Patient Name: Shelby Montgomery    Admit Diagnosis: PREGNANACY  Pregnancy (HCC)    Pre-op Diagnosis: * No pre-op diagnosis entered *        Anesthesia Procedure: LABOR ANALGESIA    * No surgeons found in log *    Pre-op vitals reviewed.  Temp: 98.6 °F (37 °C)  Pulse: 80  Resp: 16  BP: 128/74     Body mass index is 27.25 kg/m².    Current medications reviewed.  Hospital Medications:   lactated ringers IV bolus 1,000 mL  1,000 mL Intravenous Once    fentaNYL-bupivacaine 2 mcg/mL-0.125% in sodium chloride 0.9% 100 mL EPIDURAL infusion premix  12 mL/hr Epidural Continuous    fentaNYL (Sublimaze) 50 mcg/mL injection 100 mcg  100 mcg Epidural Once    EPHEDrine (PF) 25 MG/5 ML injection 5 mg  5 mg Intravenous PRN    nalbuphine (Nubain) 10 mg/mL injection 2.5 mg  2.5 mg Intravenous Q15 Min PRN    [COMPLETED] HYDROmorphone (Dilaudid) 1 MG/ML injection 1 mg  1 mg Intravenous Once    lactated ringers infusion   Intravenous Continuous    dextrose in lactated ringers 5% infusion   Intravenous PRN    lactated ringers IV bolus 500 mL  500 mL Intravenous PRN    acetaminophen (Tylenol Extra Strength) tab 500 mg  500 mg Oral Q6H PRN    acetaminophen (Tylenol Extra Strength) tab 1,000 mg  1,000 mg Oral Q6H PRN    ibuprofen (Motrin) tab 600 mg  600 mg Oral Once PRN    ondansetron (Zofran) 4 MG/2ML injection 4 mg  4 mg Intravenous Q6H PRN    oxyTOCIN in sodium chloride 0.9% (Pitocin) 30 Units/500mL infusion premix  62.5-900 davin-units/min Intravenous Continuous    terbutaline (Brethine) 1 MG/ML injection 0.25 mg  0.25 mg Subcutaneous PRN    sodium citrate-citric acid (Bicitra) 500-334 MG/5ML oral solution 30 mL  30 mL Oral PRN    [COMPLETED] ampicillin (Omnipen) 2 g in sodium chloride 0.9% 100 mL IVPB-MBP  2 g Intravenous Once    Followed by    ampicillin (Omnipen) 1 g in sodium chloride 0.9% 100 mL IVPB-MBP  1 g Intravenous Q4H    oxyTOCIN in sodium chloride 0.9% (Pitocin) 30 Units/500mL infusion  premix  0.5-20 davin-units/min Intravenous Continuous       Outpatient Medications:     Medications Prior to Admission   Medication Sig Dispense Refill Last Dose    promethazine 25 MG Oral Tab  (Patient not taking: Reported on 4/4/2024)   Not Taking    Prenatal Vit w/Qo-Abgpqakak-PC (PNV OR) Take by mouth daily.          Allergies: Patient has no known allergies.      Anesthesia Evaluation        Anesthetic Complications           GI/Hepatic/Renal    Negative GI/hepatic/renal ROS.                             Cardiovascular      ECG reviewed.  Exercise tolerance: good     MET: >4                                           Endo/Other    Negative endo/other ROS.                              Pulmonary    Negative pulmonary ROS.                       Neuro/Psych        (+) anxiety                      ADHD        History reviewed. No pertinent surgical history.  Social History     Socioeconomic History    Marital status: Single   Tobacco Use    Smoking status: Never     Passive exposure: Yes    Smokeless tobacco: Never   Vaping Use    Vaping Use: Never used   Substance and Sexual Activity    Alcohol use: No     Alcohol/week: 0.0 standard drinks of alcohol    Drug use: Yes     Types: Cannabis    Sexual activity: Yes     Partners: Male   Other Topics Concern    Caffeine Concern No    Exercise No    Seat Belt Yes     History   Drug Use    Types: Cannabis     Available pre-op labs reviewed.  Lab Results   Component Value Date    WBC 12.3 (H) 04/04/2024    RBC 4.06 04/04/2024    HGB 11.4 (L) 04/04/2024    HCT 33.1 (L) 04/04/2024    MCV 81.5 04/04/2024    MCH 28.1 04/04/2024    MCHC 34.4 04/04/2024    RDW 12.5 04/04/2024    .0 04/04/2024               Airway      Mallampati: II  Mouth opening: >3 FB  TM distance: > 6 cm  Neck ROM: full Cardiovascular    Cardiovascular exam normal.         Dental             Pulmonary    Pulmonary exam normal.                 Other findings  Dentition grossly normal.            ASA: 2    Plan: epidural  NPO status verified and patient meets guidelines.          Plan/risks discussed with: patient                Present on Admission:   Pregnancy ([facility])   GBS (group B Streptococcus carrier), +RV culture, currently pregnant ([facility])   Marijuana use during pregnancy ([facility])   Fetal heart rate decelerations affecting management of mother ([facility])   Elevated hemoglobin A1c

## 2024-04-05 NOTE — PROGRESS NOTES
Pt admitted in stable condition to room 2198. HUGS/KISSES in place. ID bands matched with Maylin ELENA RN. Educational materials reviewed and placed at bedside.

## 2024-04-05 NOTE — L&D DELIVERY NOTE
Sulaiman Montgomery, Girl [[id number]]      Labor Events     labor?: No   steroids?: None  Antibiotics received during labor?: Yes  Antibiotics (enter # doses in comment): ampicillin  Rupture date/time: 2024     Rupture type: SROM  Fluid color: Clear  Labor type: Induced Onset of Labor  Induction: Oxytocin  Indications for induction: Non-Reactive NST       Labor Event Times    Labor onset date/time: 2024        Presentation    Presentation: Vertex  Position: Left Occiput Anterior       Operative Delivery    Operative Vaginal Delivery: No                Shoulder Dystocia    Shoulder Dystocia: No       Anesthesia    Method: Epidural              [facility] Delivery      Head delivery date/time: 2024 06:35:15   Delivery date/time:  24 06:35:26   Delivery type: Normal spontaneous vaginal delivery    Details:     Delivery location: delivery room       Delivery Providers    Delivering Clinician: Ramila Hernandez MD   Delivery personnel:  Provider Role   Brandi Corrales, RN Baby Nurse   Regina Castorena RN Delivery Nurse             Cord    Vessels: 3 Vessels  Complications: None  Timed cord clamping: Yes  Time in sec: 60  Cord blood disposition: to lab  Gases sent?: No       Resuscitation    Method: None       Elcho Measurements      Weight: 2930 g 6 lb 7.4 oz Length: 49.5 cm     Head circum.: 31 cm Chest circum.: 31.5 cm      Abdominal circum.: 29 cm           Placenta    Date/time: 2024  Removal: Spontaneous  Appearance: Intact  Disposition: held for future pathology       Apgars    Living status: Living   Apgar Scoring Key:    0 1 2    Skin color Blue or pale Acrocyanotic Completely pink    Heart rate Absent <100 bpm >100 bpm    Reflex irritability No response Grimace Cry or active withdrawal    Muscle tone Limp Some flexion Active motion    Respiratory effort Absent Weak cry; hypoventilation Good, crying              1 Minute:  5 Minute:  10 Minute:   15 Minute:  20 Minute:      Skin color: 0  1       Heart rate: 2  2       Reflex irritablity: 2  2       Muscle tone: 2  2       Respiratory effort: 2  2       Total: 8  9          Apgars assigned by: JORDAN YARBROUGH       Skin to Skin    Skin to skin initiated date/time: 4/5/2024 0642  Skin to skin with: Mother       Vaginal Count    Initial count RN: Regina Castorena RN  Initial count Tech: Lanzisero, Mary Ellen   Sponges   Sharps    Initial counts 11   0    Final counts 11   1    Final count RN: Regina Castorena RN  Final count MD: Ramila Hernandez MD       Lacerations    Episiotomy: None  Perineal lacerations: 1st Repaired?: Yes     Vaginal laceration?: No      Cervical laceration?: No    Clitoral laceration?: No                  Vaginal Delivery Note    Induction of labor was achieved with pitocin and spontaneous rupture of membranes with clear fluid. Epiudural was used for pain management.  Patient progressed to completely dilated and started pushing at 0600.  With good maternal effort, patient delivered baby in occiput anterior position at 0635.   IV pitocin bolus was given.  Within 30 minutes of delivery, placenta was delivered with gentle traction.  First degree right side wall laceration was repaired in the usual fashion with 3-0 vicryl.  All instruments and sponges were counted.  Fundus was firm and bleeding minimal when provider left the room.

## 2024-04-05 NOTE — ANESTHESIA PROCEDURE NOTES
Labor Analgesia    Date/Time: 4/5/2024 4:19 AM    Performed by: Jed Soriano MD  Authorized by: Jed Soriano MD      General Information and Staff    Start Time:  4/5/2024 4:19 AM  End Time:  4/5/2024 4:27 AM  Anesthesiologist:  Jed Soriano MD  Performed by:  Anesthesiologist  Patient Location:  OB  Site Identification: surface landmarks    Reason for Block: labor epidural    Preanesthetic Checklist: patient identified, IV checked, risks and benefits discussed, monitors and equipment checked, pre-op evaluation, timeout performed, IV bolus, anesthesia consent and sterile technique used      Procedure Details    Patient Position:  Sitting  Prep: ChloraPrep    Monitoring:  Heart rate and continuous pulse ox  Approach:  Midline    Epidural Needle    Injection Technique:   Needle Type:  Tuohy  Needle Gauge:  17 G  Needle Length:  3.375 in  Location:  L3-4    Spinal Needle      Catheter    Catheter Type:  End hole  Catheter Size:  19 G  Catheter at Skin Depth:  14  Test Dose:  Negative    Assessment      Additional Comments     Patient in sitting position with ASA monitors on.  Lumbar area prepped and draped in sterile fashion.  Lido 1% skin infiltration at L3-L4 interspace.  17G Touhy epidural needle advanced midline at L3-L4 interspace into epidural space using BECCA technique with normal saline.  Catheter threaded easily.  After negative aspirate, test dose given at 0430 as noted and was negative.  Catheter taped and secured.  Epidural bolus given as noted and infusion begun.  No heme or paresthesias noted throughout.  Patient tolerated procedure well without any apparent complications.

## 2024-04-05 NOTE — CM/SW NOTE
met with Shelby (patient) to review insurance and PCP for infant. Shelby does need infant added to [place] [facility], [facility] called and asked to follow up with patient and [place]. Patient aware that if she discharges home on 4/6/24 she will need appointment for follow up on 4/8/24 or 4/9/24. Patient plans on breast feeding and formula feeding. Patient [place] breast [place] from [place] Medicaid. WIC services reviewed, pt stated that she does have WIC and will call to make follow up appointment. Patient has crib and car seat for infant. Couple stated that they live with family and would like to get their own place.  asked if couple were working? Couple stated no.  stated that father of infant will need to get employment, but that they can check with [facility] Housing Authorities to see if they have any housing openings.  stated that if father of infant is working and they find a house or apartment they may be able to get 1st and last months rent from housing Authority. Couple did not have concerns about food, utilities, or transportation at this time.  did go over [facility] for food resources.  also provided information on Teen parenting resources in [facility] and talked to them about [facility] in [place].  also provided [facility] Community Resource information. No other questions at this time.

## 2024-04-05 NOTE — PLAN OF CARE

## 2024-04-05 NOTE — PLAN OF CARE
Problem: BIRTH - VAGINAL/ SECTION  Goal: Fetal and maternal status remain reassuring during the birth process  Description: INTERVENTIONS:  - Monitor vital signs  - Monitor fetal heart rate  - Monitor uterine activity  - Monitor labor progression (vaginal delivery)  - DVT prophylaxis (C/S delivery)  - Surgical antibiotic prophylaxis (C/S delivery)  2024 by Regina Castorena RN  Outcome: Completed  2024 by Regina Castorena RN  Outcome: Progressing     Problem: PAIN - ADULT  Goal: Verbalizes/displays adequate comfort level or patient's stated pain goal  Description: INTERVENTIONS:  - Encourage pt to monitor pain and request assistance  - Assess pain using appropriate pain scale  - Administer analgesics based on type and severity of pain and evaluate response  - Implement non-pharmacological measures as appropriate and evaluate response  - Consider cultural and social influences on pain and pain management  - Manage/alleviate anxiety  - Utilize distraction and/or relaxation techniques  - Monitor for opioid side effects  - Notify MD/LIP if interventions unsuccessful or patient reports new pain  - Anticipate increased pain with activity and pre-medicate as appropriate  2024 by Reigna Castorena RN  Outcome: Completed  2024 by Regina Castorena RN  Outcome: Progressing     Problem: ANXIETY  Goal: Will report anxiety at manageable levels  Description: INTERVENTIONS:  - Administer medication as ordered  - Teach and rehearse alternative coping skills  - Provide emotional support with 1:1 interaction with staff  2024 by Regina Castorena RN  Outcome: Completed  2024 by Regina Castorena RN  Outcome: Progressing     Problem: Patient/Family Goals  Goal: Patient/Family Long Term Goa  Description: Patient's Long Term Goal:   Interventions:  -   - See additional Care Plan goals for specific interventions  2024 by Regina Castorena RN  Outcome: Completed  2024 by  Regina Castorena, RN  Outcome: Progressing  Goal: Patient/Family Short Term Goal  Description: Patient's Short Term Goal:    Interventions:   -  - See additional Care Plan goals for specific interventions  4/5/2024 0659 by Regina Castorena, RN  Outcome: Completed  4/4/2024 1959 by Regina Castorena, RN  Outcome: Progressing

## 2024-04-05 NOTE — H&P
[facility]   part of [facility]    History & Physical    Shelby Montgomery Patient Status:  Inpatient    2005 MRN [id number]   Location [facility] LABOR & DELIVERY Attending Apryl Potter MD   Hosp Day # 0 PCP Ruperto Triplett MD     Date of Admission:  2024    HPI:   Shelby Montgomery is a 18 year old  at 39w0d presenting for Induction of labor for non reassuring  testing    Seen in clinic and audible deceleration to the 90s on doppler, variables seen on NST and sent to triage.  In triage, variables again seen on NST but overall reassuring.  Decision made to move forward w delivery.    Her current obstetrical history is significant for:  Group B Strep carrier  Marijuana use in pregnancy  Teen pregnancy  Mood disorder, ADHD    Denies contractions, LOF, VB or decreased fetal movement.         History   Obstetric History:   OB History    Para Term  AB Living   1             SAB IAB Ectopic Multiple Live Births                  # Outcome Date GA Lbr Robby/2nd Weight Sex Delivery Anes PTL Lv   1 Current              Past Medical History:   Past Medical History:   Diagnosis Date    Academic problem 2014    Intrauterine pregnancy in teenager ([facility]) 2023    [  ] Overdue for glucola - reports difficulty with getting ride to lab   [ x] HgbA1C ordered 5.7  - recommend to consider delivery b/w 39-40wks as GDM unknown     Marijuana use during pregnancy ([facility]) 10/17/2023    Denies any alcohol or other drug use. Reports stopped 4 days ago on .   [X] MFM  [x ] urine tox:+cannabis 3/30/2024  Cannabis cessation advised      Past Social History: History reviewed. No pertinent surgical history.  Family History:   Family History   Problem Relation Age of Onset    Diabetes Neg     Heart Disease Neg      Social History:   Social History     Tobacco Use    Smoking status: Never     Passive exposure: Yes    Smokeless tobacco: Never   Substance Use  Topics    Alcohol use: No     Alcohol/week: 0.0 standard drinks of alcohol        Allergies/Medications:   Allergies:   No Known Allergies  Medications:  Medications Prior to Admission   Medication Sig Dispense Refill Last Dose    promethazine 25 MG Oral Tab  (Patient not taking: Reported on 2024)   Not Taking    Prenatal Vit w/Ma-Fwppqfrvu-DN (PNV OR) Take by mouth daily.          Review of Systems:   As documented in HPI, otherwise negative    Physical Exam:   Temp:  [98.6 °F (37 °C)] 98.6 °F (37 °C)  Pulse:  [74] 74  Resp:  [16] 16  BP: (102-130)/(60-71) 130/71    Constitutional: well appearing  Respiratory: no increased WOB, not using accessory muscles  Cardiac: regular rate  Abdomen: nontender  CEFM: 120/mod/+accels/intermittent variables, overall reassuring  SVE: 2/80/-3 per RN check     Results:     Lab Results   Component Value Date    TREPONEMALAB Nonreactive 2024    ABO O 2024    RH Positive 2024    WBC 12.3 (H) 2024    HGB 11.4 (L) 2024    HCT 33.1 (L) 2024    .0 2024    CREATSERUM 0.64 2019    BUN 14.0 2019     2019    K 3.80 2019     2019    CO2 27.7 2019    GLU 82 2019    ALB 3.8 2019    ALKPHO 98 (L) 2019    BILT 0.35 2019    TP 7.4 2019    AST 18 2019    ALT 17 2019       Lab Results   Component Value Date    ESRML 7 2019    COLORUR Yellow 2019    CLARITY Hazy (A) 2019    SPECGRAVITY 1.024 2019    PROUR 30 (A) 2019    GLUUR Negative 2019    KETUR Negative 2019    BILUR Negative 2019    BLOODURINE Negative 2019    NITRITE Negative 2019    UROBILINOGEN <2.0 2019    LEUUR Negative 2019       [unfilled]    BSUS:    Assessment/Plan:   Shelby Elizabeth Montgomery is a 18 year old  at 39w0d presenting for IOL for non-reassuring  testing    #IOL  - Admit SVE: /-3,  intact  - Pelvis not proven.  Determined to be adequate  - OB hemorrhage risk: Med.  Starting HgB: 11.4  - Plan pitocin and later AROM  - Plan epidural for pain management    - Discussion included   - Stages of labor and typical timing of each  - Methods of induction/augmentation  - Pain management options  - Individual hemorrhage risk   - Reasons for    - Patient's desires at the time of delivery.  Would like immediate skin-to-skin and FOB to cut the cord.  - Intrapartum support:   - Father of the baby: Seamus  - Patient's mother: Leandra  - Welcome Baby Gely!    #Fetal well being  - CEFM: Cat 2 - intermittent variables, overall reassuring  - Presentation: Vertex    # GBS status: positive  - Plan intrapartum abx: ampicillin       #Maternal co-morbidities  Marijuana use in pregnancy  Teen pregnancy - plan SW consult  Mood disorder, ADHD - no meds, stable      Ramila Hernandez MD  2024  8:29 PM    Note to patient and family   The  [employer] Cures Act makes medical notes available to patients in the interest of transparency.  However, please be advised that this is a medical document.  It is intended as [id number] communication.  It is written and medical language may contain abbreviations or verbiage that are technical and unfamiliar.  It may appear blunt or direct.  Medical documents are intended to carry relevant information, facts as evident, and the clinical opinion of the practitioner.

## 2024-04-06 NOTE — PROGRESS NOTES
[facility]  Post-Partum  Progress Note    Shelby Montgomery Patient Status:  Inpatient    2005 MRN [id number]   Location [facility] 2SW-J Attending Apryl Potter MD   Hosp Day # 2 PCP Ruperto Triplett MD     SUBJECTIVE:    Postpartum Day 1: Uncomplicated vaginal delivery    The patient feels well.  Pain is well controlled with current medications. Lochia is moderate but decreasing.   The patient is tolerating a diet. The patient is breast and formula feeding, denies breast complaints.    OBJECTIVE:    Vital signs in last 24 hours:  Temp:  [98 °F (36.7 °C)-98.2 °F (36.8 °C)] 98 °F (36.7 °C)  Pulse:  [57-88] 57  Resp:  [16-17] 16  BP: (118-136)/() 123/82    Input/Output:    Intake/Output Summary (Last 24 hours) at 2024 0809  Last data filed at 2024 1118  Gross per 24 hour   Intake --   Output 950 ml   Net -950 ml       General:    alert, appears stated age, cooperative, and no distress   Uterine Fundus:   firm at U-1      Normal appearing lochia on pad   Ext:  BLE nontender, no edema     Data Reviewed:  Recent Labs   Lab 24  1807 24  0823   RBC 4.06 3.66*   HGB 11.4* 9.9*   HCT 33.1* 30.4*   MCV 81.5 83.1   MCH 28.1 27.0   MCHC 34.4 32.6   RDW 12.5 12.6   NEPRELIM 9.35* 5.80   WBC 12.3* 8.7   .0 182.0     Rubella IgG   Date Value Ref Range Status   2023 Positive Positive Final     Comment:     Rubella IgG Result Interpretation    Negative   <5.0 IU/mL  Equivocal  >= 5.0 - 9.9 IU/mL  Positive:  >= 10.0 IU/mL               ASSESSMENT/PLAN:  18 year old  PPD#1 s/p  at 39w1d, doing well. Baby \"Marlena\" doing well, per patient.    - routine postpartum and post-op care  - acute blood loss anemia - VSS and asymptomatic, H&H decrease appropriate, continue PNV and oral Fe  - Rh pos - Rhogam not indicated  - Rubella immune  - regular diet, ambulate  - breastfeeding: lactation nursing available as needed    Dispo: d/c home today  Plan for follow up  outpatient for postpartum visit in 6 weeks.        Discharge Medications        START taking these medications        Instructions Prescription details   docusate sodium 100 MG Caps  Commonly known as: COLACE      Take 100 mg by mouth 2 (two) times daily as needed for constipation.   Quantity: 30 capsule  Refills: 0     ibuprofen 600 MG Tabs  Commonly known as: Motrin      Take 1 tablet (600 mg total) by mouth every 6 (six) hours.   Quantity: 30 tablet  Refills: 0            CONTINUE taking these medications        Instructions Prescription details   PNV OR      Take by mouth daily.   Refills: 0            STOP taking these medications      promethazine 25 MG Tabs  Commonly known as: Phenergan                  Where to Get Your Medications        These medications were sent to [id number] - [place], [place] - [address] [phone], [phone]  [address] [address]      Phone: [phone]   docusate sodium 100 MG Caps  ibuprofen 600 MG Tabs          Jaja Martell MD  [facility]  4/6/2024 8:09 AM

## 2024-04-06 NOTE — PROGRESS NOTES
Labor Analgesia Follow Up Note    Patient underwent epidural anesthesia for labor analgesia,    Placenta Date/Time: 4/5/2024  6:38 AM     Delivery Date/Time:: 4/5/2024   6:35 AM     /82 (BP Location: Right arm)   Pulse 57   Temp 98 °F (36.7 °C) (Oral)   Resp 16   Ht 1.575 m (5' 2\")   Wt 67.6 kg (149 lb)   LMP 07/15/2023 (Approximate)   SpO2 95%   Breastfeeding Yes   BMI 27.25 kg/m²     Assessment:  Patient seen and no apparent anesthesia related complications.    Thank you for asking us to participate in the care of your patient.

## 2024-04-06 NOTE — PROGRESS NOTES
Discharge instructions discussed and all questions answered. Pt feels comfortable caring for self and infant. IDbands verified. Discharged home in stable condition.

## 2024-04-08 NOTE — PROGRESS NOTES
OBGYN Post Partum apt request (delivered 04/05)    Dr pAryl Potter  [facility]  [address]   [address] [address] [phone]  Follow up 6 weeks  Unable to contact pt (phone rang 3xs then busy signal)

## 2024-04-09 NOTE — PROGRESS NOTES
OBGYN Post Partum apt request (delivered 04/05)     Dr Apryl Potter  [facility]  [address]   [address] [address] [phone]  Follow up 6 weeks  Multiple attempts; unable to contact pt (phone rang 3xs then busy signal); no apt made  Closing encounter